# Patient Record
Sex: MALE | Race: WHITE | Employment: OTHER | URBAN - NONMETROPOLITAN AREA
[De-identification: names, ages, dates, MRNs, and addresses within clinical notes are randomized per-mention and may not be internally consistent; named-entity substitution may affect disease eponyms.]

---

## 2024-10-01 ENCOUNTER — OFFICE VISIT (OUTPATIENT)
Dept: URGENT CARE | Facility: URGENT CARE | Age: 84
End: 2024-10-01
Payer: MEDICARE

## 2024-10-01 VITALS
TEMPERATURE: 97.4 F | RESPIRATION RATE: 18 BRPM | OXYGEN SATURATION: 96 % | WEIGHT: 190 LBS | SYSTOLIC BLOOD PRESSURE: 156 MMHG | HEART RATE: 59 BPM | DIASTOLIC BLOOD PRESSURE: 84 MMHG

## 2024-10-01 DIAGNOSIS — T63.441A BEE STING REACTION, ACCIDENTAL OR UNINTENTIONAL, INITIAL ENCOUNTER: Primary | ICD-10-CM

## 2024-10-01 PROCEDURE — 99203 OFFICE O/P NEW LOW 30 MIN: CPT | Performed by: FAMILY MEDICINE

## 2024-10-01 PROCEDURE — 1159F MED LIST DOCD IN RCRD: CPT | Performed by: FAMILY MEDICINE

## 2024-10-01 PROCEDURE — 1160F RVW MEDS BY RX/DR IN RCRD: CPT | Performed by: FAMILY MEDICINE

## 2024-10-01 RX ORDER — RIVAROXABAN 20 MG/1
20 TABLET, FILM COATED ORAL
COMMUNITY
Start: 2024-04-16

## 2024-10-01 RX ORDER — SODIUM BICARBONATE 650 MG/1
650 TABLET ORAL 3 TIMES DAILY
COMMUNITY
Start: 2024-05-28

## 2024-10-01 RX ORDER — HYDRALAZINE HYDROCHLORIDE 25 MG/1
25 TABLET, FILM COATED ORAL 3 TIMES DAILY
COMMUNITY
Start: 2024-09-19

## 2024-10-01 RX ORDER — AMLODIPINE BESYLATE 10 MG/1
10 TABLET ORAL
COMMUNITY
Start: 2024-07-24 | End: 2025-01-20

## 2024-10-01 RX ORDER — SIMVASTATIN 10 MG/1
10 TABLET, FILM COATED ORAL NIGHTLY
COMMUNITY

## 2024-10-01 RX ORDER — WARFARIN 2 MG/1
2 TABLET ORAL
COMMUNITY

## 2024-10-01 RX ORDER — TRAZODONE HYDROCHLORIDE 50 MG/1
1 TABLET ORAL NIGHTLY
COMMUNITY
Start: 2024-04-22

## 2024-10-01 RX ORDER — FAMOTIDINE 20 MG/1
20 TABLET, FILM COATED ORAL
COMMUNITY
Start: 2024-07-15

## 2024-10-01 RX ORDER — PREGABALIN 25 MG/1
25 CAPSULE ORAL 2 TIMES DAILY
COMMUNITY

## 2024-10-01 RX ORDER — VALSARTAN 80 MG/1
80 TABLET ORAL
COMMUNITY
Start: 2024-03-26

## 2024-10-01 RX ORDER — CARVEDILOL 6.25 MG/1
1 TABLET ORAL
COMMUNITY
Start: 2024-07-24

## 2024-10-01 RX ORDER — ASPIRIN 81 MG/1
81 TABLET ORAL
COMMUNITY
Start: 2023-05-03

## 2024-10-01 RX ORDER — CETIRIZINE HYDROCHLORIDE 10 MG/1
10 TABLET ORAL DAILY
Qty: 30 TABLET | Refills: 0 | Status: SHIPPED | OUTPATIENT
Start: 2024-10-01 | End: 2024-10-31

## 2024-10-01 RX ORDER — CALCIUM CARBONATE/VITAMIN D3 600MG-5MCG
2 TABLET ORAL 2 TIMES DAILY
COMMUNITY
Start: 2023-06-09

## 2024-10-01 RX ORDER — OMEPRAZOLE 40 MG/1
40 CAPSULE, DELAYED RELEASE ORAL
COMMUNITY

## 2024-10-01 RX ORDER — AMLODIPINE BESYLATE 10 MG/1
10 TABLET ORAL DAILY
COMMUNITY

## 2024-10-01 RX ORDER — ATORVASTATIN CALCIUM 80 MG/1
80 TABLET, FILM COATED ORAL DAILY
COMMUNITY

## 2024-10-01 RX ORDER — HYDROCHLOROTHIAZIDE 12.5 MG/1
1 TABLET ORAL DAILY
COMMUNITY

## 2024-10-01 RX ORDER — PREDNISONE 20 MG/1
40 TABLET ORAL DAILY
Qty: 6 TABLET | Refills: 0 | Status: SHIPPED | OUTPATIENT
Start: 2024-10-01 | End: 2024-10-04

## 2024-10-01 ASSESSMENT — ENCOUNTER SYMPTOMS
ABDOMINAL PAIN: 0
SORE THROAT: 0
WOUND: 1
SHORTNESS OF BREATH: 0
CONSTITUTIONAL NEGATIVE: 1
NECK PAIN: 0
PSYCHIATRIC NEGATIVE: 1
CHEST TIGHTNESS: 0
DIARRHEA: 0
DYSURIA: 0
HEADACHES: 0
ALLERGIC/IMMUNOLOGIC NEGATIVE: 1
NEUROLOGICAL NEGATIVE: 1
BACK PAIN: 0
GASTROINTESTINAL NEGATIVE: 1
WHEEZING: 0
COUGH: 0
ENDOCRINE NEGATIVE: 1
SINUS PAIN: 0
CONSTIPATION: 0
RESPIRATORY NEGATIVE: 1
HEMATURIA: 0
EYES NEGATIVE: 1
DIZZINESS: 0

## 2024-10-01 NOTE — PATIENT INSTRUCTIONS
Allergic skin reaction -   1) Prednisone for 3 days.  - may activate or cause extra energy.  Try not to take right before bed.  - steroid for inflammation, Rx sent.  2) Zyrtec (cetirizine) - for histamine in blood to prevent recurrence of rash or itching.  - take 1 daily (at night if very sensitive to medications)  - After 1 week, if rash is gone may stop.  Restart if rash or itching return.    If become red, worse, fever, or new symptoms he is advised to see PCP or return for further evaluation.   Patient verbalized understanding and agreement.

## 2024-10-01 NOTE — PROGRESS NOTES
Subjective   Patient ID: Dayo Glover is a 84 y.o. male. They present today with a chief complaint of Injury (Stung by several bees yesterday both arms, hands, chest).    History of Present Illness  Dayo Glover is a 84 y.o. male who presents to the  for a concern of B/L arms which swelling up after getting stung by several bees yesterday.  He admits that he was in his yard when he knocked over a nest.  He used cool compresses last night and topical cream for itch last night with mild relief.  He denies dysphagia, wheeze or palpitations.  He is on blood thinners.          Past Medical History  Allergies as of 10/01/2024 - Reviewed 10/01/2024   Allergen Reaction Noted    Hydroxyurea Unknown 05/06/2013    Sucralfate GI Upset 02/22/2018       (Not in a hospital admission)       History reviewed. No pertinent past medical history.    Past Surgical History:   Procedure Laterality Date    BACK SURGERY  10/05/2016    Back Surgery            Review of Systems  Review of Systems   Constitutional: Negative.    HENT: Negative.  Negative for congestion, ear pain, sinus pain and sore throat.    Eyes: Negative.    Respiratory: Negative.  Negative for cough, chest tightness, shortness of breath and wheezing.    Gastrointestinal: Negative.  Negative for abdominal pain, constipation and diarrhea.   Endocrine: Negative.    Genitourinary: Negative.  Negative for dysuria and hematuria.   Musculoskeletal:  Negative for back pain and neck pain.   Skin:  Positive for wound.   Allergic/Immunologic: Negative.    Neurological: Negative.  Negative for dizziness and headaches.   Psychiatric/Behavioral: Negative.       Objective    Vitals:    10/01/24 1355   BP: 156/84   BP Location: Left arm   Patient Position: Sitting   Pulse: 59   Resp: 18   Temp: 36.3 °C (97.4 °F)   TempSrc: Oral   SpO2: 96%   Weight: 86.2 kg (190 lb)     No LMP for male patient.    Physical Exam  Vitals and nursing note reviewed.   Constitutional:        Appearance: Normal appearance.   HENT:      Head: Atraumatic.      Nose: Nose normal.   Eyes:      Extraocular Movements: Extraocular movements intact.      Pupils: Pupils are equal, round, and reactive to light.   Musculoskeletal:         General: Normal range of motion.   Skin:     General: Skin is warm and dry.      Comments: Forearms B/L shows bruising and scab lesion where puncture wound and swelling.  Right hand has moderate swelling  Right bicep has pink area around a welt  Chest has similar pink area around a welt.   Neurological:      General: No focal deficit present.      Mental Status: He is alert and oriented to person, place, and time.       Procedures    Point of Care Test & Imaging Results from this visit  No results found for this visit on 10/01/24.   No results found.    Diagnostic study results (if any) were reviewed by Nisreen Sal DO.    Assessment/Plan   Allergies, medications, history, and pertinent labs/EKGs/Imaging reviewed by Nisreen Sal DO.     Orders and Diagnoses  Diagnoses and all orders for this visit:  Bee sting reaction, accidental or unintentional, initial encounter  -     predniSONE (Deltasone) 20 mg tablet; Take 2 tablets (40 mg) by mouth once daily for 3 days.  -     cetirizine (ZyrTEC) 10 mg tablet; Take 1 tablet (10 mg) by mouth once daily.    Patient Instructions   Allergic skin reaction -   1) Prednisone for 3 days.  - may activate or cause extra energy.  Try not to take right before bed.  - steroid for inflammation, Rx sent.  2) Zyrtec (cetirizine) - for histamine in blood to prevent recurrence of rash or itching.  - take 1 daily (at night if very sensitive to medications)  - After 1 week, if rash is gone may stop.  Restart if rash or itching return.    If become red, worse, fever, or new symptoms he is advised to see PCP or return for further evaluation.   Patient verbalized understanding and agreement.       Medical Admin Record    Patient disposition:  Home    Electronically signed by Nisreen Sal DO  2:21 PM

## 2024-10-01 NOTE — LETTER
Dayo Glover  1940    10/1/2024    To Whom This May Concern:    My patient, Dayo Glover, is unable to perform Jury Duty due to a mental or physical condition that renders the prospective juror unfit for jury service for the remainder of the jury year.    Should you have any questions please do not hesitate to call.    Thank you for your cooperation.    Sincerely,    Nisreen Sal, DO